# Patient Record
Sex: FEMALE | Race: ASIAN | Employment: UNEMPLOYED | URBAN - METROPOLITAN AREA
[De-identification: names, ages, dates, MRNs, and addresses within clinical notes are randomized per-mention and may not be internally consistent; named-entity substitution may affect disease eponyms.]

---

## 2020-04-09 ENCOUNTER — OFFICE VISIT (OUTPATIENT)
Dept: OBGYN CLINIC | Age: 35
End: 2020-04-09

## 2020-04-09 ENCOUNTER — HOSPITAL ENCOUNTER (OUTPATIENT)
Dept: PERINATAL CARE | Age: 35
Discharge: HOME | End: 2020-04-09
Attending: OBSTETRICS & GYNECOLOGY
Payer: COMMERCIAL

## 2020-04-09 VITALS
SYSTOLIC BLOOD PRESSURE: 120 MMHG | HEIGHT: 65 IN | BODY MASS INDEX: 22.56 KG/M2 | DIASTOLIC BLOOD PRESSURE: 80 MMHG | WEIGHT: 135.38 LBS

## 2020-04-09 DIAGNOSIS — Z34.80 SUPERVISION OF OTHER NORMAL PREGNANCY: Primary | ICD-10-CM

## 2020-04-09 PROCEDURE — 76813 OB US NUCHAL MEAS 1 GEST: CPT | Performed by: OBSTETRICS & GYNECOLOGY

## 2020-04-09 NOTE — PATIENT INSTRUCTIONS

## 2020-04-09 NOTE — PROGRESS NOTES
Initial OB Visit    Chavo Cano is a 27 y.o.  presenting for initial OB visit. Her first ultrasound was at [facility] department. She is well without complaints today. She denies nausea/vomiting. She denies pelvic pain and vaginal bleeding. Her past medical history is significant for nothing. This is her second pregnancy. Her past pregnancies have been uncomplicated. This pregnancy has been without complications. She denies a history of domestic violence. She has answered our office questionnaire. She and her partners substance history, exposure history, genetic history, and infectious history are remarkable for recent travel from [place]. Ob/Gyn Hx:    Patient's last menstrual period was 2020 (exact date). EDC-  History of STIs: Denies  SA: Yes, one partner    Health maintenance:  Pap-    OB History        2    Para   1    Term   1            AB        Living   1       SAB        TAB        Ectopic        Molar        Multiple        Live Births                  History reviewed. No pertinent past medical history. Past Surgical History:   Procedure Laterality Date    HX BREAST BIOPSY         No family history on file.   Social History     Socioeconomic History    Marital status:      Spouse name: Not on file    Number of children: Not on file    Years of education: Not on file    Highest education level: Not on file   Occupational History    Not on file   Social Needs    Financial resource strain: Not on file    Food insecurity     Worry: Not on file     Inability: Not on file    Transportation needs     Medical: Not on file     Non-medical: Not on file   Tobacco Use    Smoking status: Never Smoker    Smokeless tobacco: Never Used   Substance and Sexual Activity    Alcohol use: Not Currently    Drug use: Never    Sexual activity: Yes     Partners: Male     Birth control/protection: None   Lifestyle    Physical activity     Days per week: Not on file Minutes per session: Not on file    Stress: Not on file   Relationships    Social connections     Talks on phone: Not on file     Gets together: Not on file     Attends Cheondoism service: Not on file     Active member of club or organization: Not on file     Attends meetings of clubs or organizations: Not on file     Relationship status: Not on file    Intimate partner violence     Fear of current or ex partner: Not on file     Emotionally abused: Not on file     Physically abused: Not on file     Forced sexual activity: Not on file   Other Topics Concern    Not on file   Social History Narrative    Not on file       Current Outpatient Medications   Medication Sig Dispense Refill    PNV DA.60/APTIBHI fum/folic ac (PRENATAL PO) Take  by mouth.        Not on File    Review of Systems - History obtained from the patient  Constitutional: negative for weight loss, fever, night sweats  HEENT: negative for hearing loss, earache, congestion, snoring, sorethroat  CV: negative for chest pain, palpitations, edema  Resp: negative for cough, shortness of breath, wheezing  GI: negative for change in bowel habits, abdominal pain, black or bloody stools  : negative for frequency, dysuria, hematuria, vaginal discharge  MSK: negative for back pain, joint pain, muscle pain  Breast: negative for breast lumps, nipple discharge, galactorrhea  Skin :negative for itching, rash, hives  Neuro: negative for dizziness, headache, confusion, weakness  Psych: negative for anxiety, depression, change in mood  Heme/lymph: negative for bleeding, bruising, pallor    Physical Exam    Visit Vitals  /80 (BP 1 Location: Right arm, BP Patient Position: Sitting)   Ht 5' 4.96\" (1.65 m)   Wt 135 lb 6 oz (61.4 kg)   LMP 01/11/2020 (Exact Date)   BMI 22.55 kg/m²       Constitutional  · Appearance: well-nourished, well developed, alert, in no acute distress    HENT  · Head and Face: appears normal    Skin  · General Inspection: no rash, no lesions identified    Neurologic/Psychiatric  · Mental Status:  · Orientation: grossly oriented to person, place and time  · Mood and Affect: mood normal, affect appropriate      Assessment/Plan:  27 y.o.  at 12w 6d by health department sono? Reviewed EDC and dating ultrasound. IOB packet given and discussed including practice structure and collaborative care with MDs and Midwives. Reviewed call coverage including hospitalists. Discussed expected prenatal care and visits including IOB labs, anatomy scan, GTT, TDAP, Rhogam if indicated, third trimester labs and GBS. Dicussed emergency contact info. Discussed other high yield topics including appropriate exercise, diet, nutrition, and expected weight gain in pregnancy. Handouts given. Discussed sex in pregnancy, avoiding cat litter, toxin/alcohol avoidance, traveling in pregnancy including zika travel warnings. She and partner have not recently and do not plan on traveling to [facility] areas during this pregnancy. She desires NT with MFM today    We discussed COVID precautions and limiting office visits to limit her possible exposure at this time. She was encouraged to use mychart, virtual visits and phone calls until further notice, she voices understanding. She will return in 6 weeks for next appt. New OB labs today. Continue daily PNV. RTC: 1 month, or sooner prn for problems or concerns. Cramping, pain, bleeding precautions reviewed. Will contact with abnormal results. Handouts and instructions provided.     Lauren Griffin MD  2020  11:16 AM

## 2020-04-12 LAB
# FETUSES US: 1
1ST TRIMESTER SCN+NT PATIENT-IMP: NORMAL
ADDITIONAL US, 018178: NORMAL
AGE AT DELIVERY: 36.4 YR
COMMENTS, 017532: NORMAL
FET CRL US.MEAS: 51.7 MM
FET NUCHAL FOLD MOM THICKNESS US.MEAS: 1.06
FET NUCHAL FOLD THICKNESS US.MEAS: 1.3 MM
FET TS 18 RISK FROM MAT AGE: NORMAL
FET TS 21 RISK FROM MAT AGE: NORMAL
GA: 11.7 WEEKS
HCG ADJ MOM SERPL: 1.75
HCG SERPL-ACNC: 192.2 IU/ML
INHIBIN A ADJ MOM SERPL: 1.92
INHIBIN A SERPL-MCNC: 546.2 PG/ML
NOTE:, 018078: NORMAL
PAPP-A MOM, 017516: 1.06
PAPP-A SERPL-MCNC: 872.7 NG/ML
RESULTS, 017501: NORMAL
SONOGRAPHER: NORMAL
TS 18 RISK FETUS: NORMAL
TS 21 RISK FETUS: NORMAL
US DATE: NORMAL

## 2020-04-13 ENCOUNTER — TELEPHONE (OUTPATIENT)
Dept: PERINATAL CARE | Age: 35
End: 2020-04-13

## 2023-05-17 ENCOUNTER — RESEARCH ENCOUNTER (OUTPATIENT)
Age: 38
End: 2023-05-17